# Patient Record
Sex: MALE | Race: WHITE | HISPANIC OR LATINO | Employment: UNEMPLOYED | URBAN - METROPOLITAN AREA
[De-identification: names, ages, dates, MRNs, and addresses within clinical notes are randomized per-mention and may not be internally consistent; named-entity substitution may affect disease eponyms.]

---

## 2023-12-11 ENCOUNTER — HOSPITAL ENCOUNTER (EMERGENCY)
Facility: CLINIC | Age: 38
Discharge: HOME | End: 2023-12-12
Attending: EMERGENCY MEDICINE | Admitting: EMERGENCY MEDICINE
Payer: COMMERCIAL

## 2023-12-11 ENCOUNTER — APPOINTMENT (OUTPATIENT)
Dept: GENERAL RADIOLOGY | Facility: CLINIC | Age: 38
End: 2023-12-11
Attending: EMERGENCY MEDICINE

## 2023-12-11 VITALS
SYSTOLIC BLOOD PRESSURE: 156 MMHG | HEART RATE: 114 BPM | DIASTOLIC BLOOD PRESSURE: 99 MMHG | OXYGEN SATURATION: 98 % | RESPIRATION RATE: 18 BRPM | TEMPERATURE: 98 F

## 2023-12-11 DIAGNOSIS — S97.81XA CRUSHING INJURY OF RIGHT FOOT, INITIAL ENCOUNTER: ICD-10-CM

## 2023-12-11 PROCEDURE — 99283 EMERGENCY DEPT VISIT LOW MDM: CPT

## 2023-12-11 PROCEDURE — 73630 X-RAY EXAM OF FOOT: CPT | Mod: RT

## 2023-12-11 RX ORDER — HYDROCODONE BITARTRATE AND ACETAMINOPHEN 5; 325 MG/1; MG/1
1 TABLET ORAL ONCE
Status: COMPLETED | OUTPATIENT
Start: 2023-12-12 | End: 2023-12-12

## 2023-12-11 RX ORDER — HYDROCODONE BITARTRATE AND ACETAMINOPHEN 5; 325 MG/1; MG/1
1 TABLET ORAL EVERY 6 HOURS PRN
Qty: 6 TABLET | Refills: 0 | Status: SHIPPED | OUTPATIENT
Start: 2023-12-11

## 2023-12-11 NOTE — Clinical Note
Otilio Kirk was seen and treated in our emergency department on 12/11/2023.  He may return to work on 12/13/2023.  Mr. Kirk was treated in the emergency department late this evening.  His wife who is with him needed to stay till late in the evening to help care for him and his injuries.  Please excuse her from time missed at work to allow her to care for her .  Thank you for your understanding     If you have any questions or concerns, please don't hesitate to call.      Tapan White MD

## 2023-12-12 PROCEDURE — 250N000013 HC RX MED GY IP 250 OP 250 PS 637: Performed by: EMERGENCY MEDICINE

## 2023-12-12 RX ADMIN — HYDROCODONE BITARTRATE AND ACETAMINOPHEN 1 TABLET: 5; 325 TABLET ORAL at 00:00

## 2023-12-12 NOTE — DISCHARGE INSTRUCTIONS
Seen today after dropping a large tire on your right foot.  There is significant swelling from the injury but fortunately no evidence of fracture or dislocation to the bones.  Please keep your foot elevated for comfort.  You have been given a hard soled shoe and crutches to use for support and weightbearing as tolerated.  Ice ibuprofen and Tylenol also recommended in addition to a short course of pain medicine

## 2023-12-12 NOTE — ED TRIAGE NOTES
Pt presents to triage with c/o R foot injury. Pt slipped at work and dropped a large tire of his foot. Unable to bear weight. Foot is very swollen.

## 2023-12-12 NOTE — ED PROVIDER NOTES
History     Chief Complaint:  Foot Injury     The history is provided by the patient.      Otilio Kirk is a 38 year old male who presents to the ED for evaluation of foot injury. He reports picking up a large tire and having it dropped on his right foot. He states he has had surgery on his right foot 2 years ago. He reports not being able to put weight on the foot.     Independent Historian:    None    Review of External Notes:  None    Medications:    The patient is not currently taking any prescribed medications.    Past Medical History:    No pertinent past medical history     Past Surgical History:    Past Surgical History:   Procedure Laterality Date    ANKLE SURGERY Right           Physical Exam   Patient Vitals for the past 24 hrs:   BP Temp Pulse Resp SpO2   12/11/23 2045 156/99 98  F (36.7  C) 114 18 98 %      Physical Exam  Nursing note and vitals reviewed.  Constitutional: Cooperative.   Cardiovascular: Normal rate, regular rhythm.  2+ DP pulse on right foot  Pulmonary/Chest: Effort normal.   Musculoskeletal: Swelling to right foot with defuse tenderness most prominently over the fifth metatarsal.  Normal range of motion of the ankle  Neurological: Alert.  Strength and sensation in right foot is normal  Skin: Skin is warm and dry. No oozing noted in right foot  Psychiatric: Normal mood and affect.     Emergency Department Course     Imaging:  Foot  XR, G/E 3 views, right   Final Result   IMPRESSION: No acute fracture or malalignment. Severe naviculocuneiform joint degenerative changes. Pes planus.      Report per radiology     Interventions:  Medications - No data to display     Independent Interpretation (X-rays, CTs, rhythm strip):  I independently reviewed the Right Foot XR and see no evidence for fractures.     Assessments/Consultations/Discussion of Management or Tests:   ED Course as of 12/11/23 2337   Mon Dec 11, 2023   2330 I obtained history and examined the patient as noted above.    2335 I  prepared the patient to be discharged home.      Social Determinants of Health affecting care:  None     Disposition:  The patient was discharged to home.     Impression & Plan      Medical Decision Makin-year-old gentleman with a crush injury to his right foot.  Fortunately no radiographic evidence of fracture or dislocation.  He is neurovascularly intact.  No evidence of compartment syndrome.  I did consider Lisfranc injury but the majority of his tenderness is over the fifth metatarsal region as opposed to the midfoot.  He will be given a hard soled postop shoe with crutches and toe-touch weightbearing as tolerated.  Rest ice ibuprofen recommended    Diagnosis:    ICD-10-CM   1. Crushing injury of right foot, initial encounter  S97.81XA             Discharge Medications:  New Prescriptions    HYDROCODONE-ACETAMINOPHEN (NORCO) 5-325 MG TABLET    Take 1 tablet by mouth every 6 hours as needed for pain      Scribe Disclosure:  Radha MCGEE, am serving as a scribe at 11:34 PM on 2023 to document services personally performed by Tapan White MD based on my observations and the provider's statements to me.  2023   Tapan White MD Amdahl, John, MD  [phone]